# Patient Record
Sex: FEMALE | Race: WHITE
[De-identification: names, ages, dates, MRNs, and addresses within clinical notes are randomized per-mention and may not be internally consistent; named-entity substitution may affect disease eponyms.]

---

## 2017-04-05 ENCOUNTER — HOSPITAL ENCOUNTER (OUTPATIENT)
Dept: HOSPITAL 80 - FIMAGING | Age: 47
End: 2017-04-05
Attending: GENERAL ACUTE CARE HOSPITAL
Payer: COMMERCIAL

## 2017-04-05 DIAGNOSIS — Z80.3: ICD-10-CM

## 2017-04-05 DIAGNOSIS — Z12.31: Primary | ICD-10-CM

## 2017-04-05 PROCEDURE — G0202 SCR MAMMO BI INCL CAD: HCPCS

## 2017-10-13 ENCOUNTER — HOSPITAL ENCOUNTER (OUTPATIENT)
Dept: HOSPITAL 80 - FIMAGING | Age: 47
End: 2017-10-13
Attending: INTERNAL MEDICINE
Payer: COMMERCIAL

## 2017-10-13 DIAGNOSIS — Z80.3: ICD-10-CM

## 2017-10-13 DIAGNOSIS — R92.8: Primary | ICD-10-CM

## 2017-10-13 PROCEDURE — C8908 MRI W/O FOL W/CONT, BREAST,: HCPCS

## 2017-10-13 PROCEDURE — A9585 GADOBUTROL INJECTION: HCPCS

## 2017-10-31 ENCOUNTER — HOSPITAL ENCOUNTER (OUTPATIENT)
Dept: HOSPITAL 80 - FIMAGING | Age: 47
End: 2017-10-31
Attending: INTERNAL MEDICINE
Payer: COMMERCIAL

## 2017-10-31 DIAGNOSIS — Z12.39: Primary | ICD-10-CM

## 2017-10-31 DIAGNOSIS — Z80.3: ICD-10-CM

## 2017-10-31 DIAGNOSIS — R92.8: ICD-10-CM

## 2017-10-31 PROCEDURE — G0206 DX MAMMO INCL CAD UNI: HCPCS

## 2018-04-16 ENCOUNTER — HOSPITAL ENCOUNTER (OUTPATIENT)
Dept: HOSPITAL 80 - FIMAGING | Age: 48
End: 2018-04-16
Attending: INTERNAL MEDICINE
Payer: COMMERCIAL

## 2018-04-16 DIAGNOSIS — N63.20: Primary | ICD-10-CM

## 2018-04-16 PROCEDURE — C8908 MRI W/O FOL W/CONT, BREAST,: HCPCS

## 2018-04-16 PROCEDURE — A9585 GADOBUTROL INJECTION: HCPCS

## 2018-05-27 NOTE — GHP
[f rep st]



                                                            HISTORY AND PHYSICAL





DATE OF ADMISSION:  2018



CHIEF COMPLAINT:  High-risk breast cancer.



HISTORY OF PRESENT ILLNESS:  Lia is a 47-year-old woman who presented to discuss prophylactic bilat
eral mastectomy.  She has an increased risk of breast cancer based on her family history.  She had he
r 1st screening mammogram and ultrasound 2016, which was negative.  She had a breast MRI Apri
l 16, 2018, which showed stable appearance of both breasts.  There is an area of nonenhancing nodular
ity in the lateral aspect of the left breast, which may represent focal scar, BI-RADS 2.  She is frus
trated by the increased screening process and would like risk reduction surgery.  Her [facility] breast cancer risk is 35%.  She had menarche at age 14.  She still menstruates regularly.  She
 is .  She has never had a previous breast biopsy.  She does have [place] descent.  No 
family of ovarian cancer.  Her mother was diagnosed with breast cancer at age 69, a maternal aunt wit
h breast cancer at age 60, maternal cousin with breast cancer at age 38.  She has never used hormone 
replacement therapy.



PAST MEDICAL HISTORY:  None.



PAST SURGICAL HISTORY:  Breast reduction, bilateral.



MEDICATIONS:  None.



ALLERGIES:  Morphine.



FAMILY HISTORY:  Breast cancer as above, lung cancer in her uncle.



SOCIAL HISTORY:  She uses occasional alcohol.  She does use marijuana.  She does not use tobacco prod
ucts.



REVIEW OF SYSTEMS:  A 10-point review of systems negative, except per HPI.



PHYSICAL EXAMINATION:  GENERAL:  Well-developed, well-nourished woman, in no acute distress.  HEENT: 
 Normocephalic.  No gross hearing deficits.  Pupils equal and round.  No scleral icterus.  Mucous mem
branes moist. RESPIRATORY:  Clear to auscultation bilaterally.  No increased work of breathing.  CARD
IAC:  Regular rate.  MUSCULOSKELETAL:  Normal gait.  Normal nails.  PSYCH:  Mood and affect normal.  
NEURO:  Grossly intact.



IMPRESSION AND PLAN:  A 47-year-old with a 35% AKRINE breast cancer risk.  She desires prophylactic darin
ateral mastectomy without reconstruction.  We did spend a lot of time discussing sentinel lymph nodes
.  She has declined sentinel lymph node biopsy.  She understands that if occult breast cancer is foun
d that we would recommend an axillary dissection.  She stated that even if breast cancer is found she
 is not sure that she would pursue any treatment for it.  We discussed the risks and benefits of surg
freda.  We discussed that this is a risk reduction surgery, although it does not make her risk to 0%.  
She understands that there will be issues with scarring and will be a changing cosmesis.  She underst
ands there could be infection and wound healing issues.  There is also risks associated such as strok
e, heart attack, death, and blood clots.





Job #:  [id number]

## 2018-06-01 ENCOUNTER — HOSPITAL ENCOUNTER (OUTPATIENT)
Dept: HOSPITAL 80 - F3E | Age: 48
Setting detail: OBSERVATION
LOS: 1 days | Discharge: HOME | End: 2018-06-02
Attending: SURGERY | Admitting: SURGERY
Payer: COMMERCIAL

## 2018-06-01 DIAGNOSIS — N60.91: Primary | ICD-10-CM

## 2018-06-01 DIAGNOSIS — Z80.3: ICD-10-CM

## 2018-06-01 DIAGNOSIS — Z15.01: ICD-10-CM

## 2018-06-01 DIAGNOSIS — N60.92: ICD-10-CM

## 2018-06-01 PROCEDURE — 19303 MAST SIMPLE COMPLETE: CPT

## 2018-06-01 PROCEDURE — G0378 HOSPITAL OBSERVATION PER HR: HCPCS

## 2018-06-01 PROCEDURE — 0HTV0ZZ RESECTION OF BILATERAL BREAST, OPEN APPROACH: ICD-10-PCS | Performed by: SURGERY

## 2018-06-01 RX ADMIN — KETOROLAC TROMETHAMINE SCH MG: 15 INJECTION, SOLUTION INTRAMUSCULAR; INTRAVENOUS at 23:48

## 2018-06-01 RX ADMIN — KETOROLAC TROMETHAMINE SCH: 15 INJECTION, SOLUTION INTRAMUSCULAR; INTRAVENOUS at 15:46

## 2018-06-01 RX ADMIN — KETOROLAC TROMETHAMINE SCH MG: 15 INJECTION, SOLUTION INTRAMUSCULAR; INTRAVENOUS at 17:29

## 2018-06-01 RX ADMIN — Medication PRN MCG: at 13:22

## 2018-06-01 RX ADMIN — HYDROCODONE BITARTRATE AND ACETAMINOPHEN PRN TAB: 5; 325 TABLET ORAL at 21:27

## 2018-06-01 RX ADMIN — Medication PRN MCG: at 12:41

## 2018-06-01 NOTE — PDANEPAE
ANE History of Present Illness





47 year old with breasst ca





ANE Past Medical History





- Cardiovascular History


Hx Hypertension: No


Hx Arrhythmias: No


Hx Chest Pain: No


Hx Coronary Artery / Peripheral Vascular Disease: No


Hx CHF / Valvular Disease: No


Hx Palpitations: No


Cardiovascular History Comment: BP runs low





- Pulmonary History


Hx COPD: No


Hx Asthma/Reactive Airway Disease: No


Hx Recent Upper Respiratory Infection: No


Hx Oxygen in Use at Home: No


Hx Sleep Apnea: No


Sleep Apnea Screening Result - Last Documented: Positive


Pulmonary History Comment: XOCHILT uses CPAP





- Neurologic History


Hx Cerebrovascular Accident: No


Hx Seizures: No


Hx Dementia: No





- Endocrine History


Hx Diabetes: No





- Renal History


Hx Renal Disorders: No





- Liver History


Hx Hepatic Disorders: No





- Neurological & Psychiatric Hx


Hx Neurological and Psychiatric Disorders: No





- Cancer History


Hx Cancer: Yes





- Congenital Disorder History


Hx Congenital Disorders: No





- GI History


Hx Gastrointestinal Disorders: No





- Other Health History


Other Health History: nobe





- Chronic Pain History


Chronic Pain: No





- Surgical History


Prior Surgeries: knee scope,adanoids





ANE Review of Systems


Review of systems is: negative


Review of Systems: 








- Exercise capacity


METS (RN): 4 METS





ANE Patient History





- Allergies


Allergies/Adverse Reactions: 








morphine Allergy (Verified 05/07/18 15:36)


 Vomiting








- Home Medications


Home medications: home medication list seen and reviewed


Home Medications: 








NK [No Known Home Meds]  05/04/18 [Last Taken Unknown]








- NPO status


NPO Status: no food or drink >8 hours


NPO Since - Liquids (Date): 06/01/18


NPO Since - Liquids (Time): 02:00


NPO Since - Solids (Date): 05/31/18


NPO Since - Solids (Time): 19:00





- Anes Hx


Anes Hx: no prior problems





- Smoking Hx


Smoking Status: Never smoked





- Alcohol Use


Alcohol Use: None





- Family Anes Hx


Family Anes Hx: none


Family Hx Anesthesia Complications: none





ANE Labs/Vital Signs





- Vital Signs


Blood Pressure: 110/68


Heart Rate: 68


Respiratory Rate: 16


O2 Sat (%): 96


Height: 168.91 cm


Weight: 97.522 kg





ANE Physical Exam





- Airway


Neck exam: FROM


Mallampati Score: Class 1





- Pulmonary


Pulmonary: no respiratory distress





- Cardiovascular


Cardiovascular: regular rate and rhythym





- ASA Status


ASA Status: II





ANE Anesthesia Plan


Anesthesia Plan: GA w LMA

## 2018-06-01 NOTE — GOP
[f 
rep st]



                                                                OPERATIVE REPORT





DATE OF OPERATION:  06/01/2018



SURGEON:  Lindsey Sahni MD



ANESTHESIA:  Jigar Reyes MD/General.



PREOPERATIVE DIAGNOSIS:  High-risk breast cancer.



POSTOPERATIVE DIAGNOSIS:  High-risk breast cancer.



PROCEDURE PERFORMED:  Bilateral mastectomy.



FINDINGS:  No unusual



SPECIMENS:  Right breast short superior long lateral, left breast short 
superior long lateral.



ESTIMATED BLOOD LOSS:  50 cc.



INDICATIONS:  The patient is a 47-year-old woman who has an KARINE risk of 35% of 
developing breast cancer.  She did not want to continue doing high-risk 
screening.  She desired prophylactic mastectomy.



DESCRIPTION OF PROCEDURE:  The patient was brought into the operating room, 
placed supine on the table and general anesthesia was administered.  Her 
bilateral breast, chest and axilla were prepped and draped in the usual sterile 
fashion.  I made an ellipse around the right breast.  I created a superior 
inferior skin flap.  My dissection occurred to the clavicle, inframammary fold, 
mid axillary line and sternum.  I removed the breast and left the pectoralis 
fascia behind.  Hemostasis was achieved in the wound.  I placed a 15 round 
silicone drain and sutured it in place with 3-0 nylon.  I closed the wound with 
3-0 Vicryl.  I created the V and Y flap laterally.  I closed the skin with 4-0 
Monocryl, Mastisol, Steri-Strips, and a sterile dressing were applied.  In a 
similar fashion, I performed a mastectomy on the left side again with the V and 
Y flap on the lateral edge.  She tolerated the procedure well.  She was 
awakened in the operating room, extubated, transferred to PACU in stable 
condition.



DRAINS:  2 Shad-Pepe drains.





Job #:  [id number]

MTDD

## 2018-06-01 NOTE — POSTOPPROG
Post Op Note


Date of Operation: 06/01/18


Surgeon: Lindsey Sahni


Anesthesiologist: warm


Anesthesia: GET(General Endotracheal)


Pre-op Diagnosis: high risk breast ca


Post-op Diagnosis: same


Indication: 48 yo with 35% KARINE risk developing breast ca


Procedure: bilateral mastectomy


Findings: no unusual


Inf/Abcess present in the surg proc area at time of surgery?: No


EBL: Minimal


Specimen(s): 





bilateral breast short superior long lateral

## 2018-06-02 VITALS — SYSTOLIC BLOOD PRESSURE: 105 MMHG | DIASTOLIC BLOOD PRESSURE: 67 MMHG

## 2018-06-02 RX ADMIN — KETOROLAC TROMETHAMINE SCH MG: 15 INJECTION, SOLUTION INTRAMUSCULAR; INTRAVENOUS at 05:40

## 2018-06-02 RX ADMIN — HYDROCODONE BITARTRATE AND ACETAMINOPHEN PRN TAB: 5; 325 TABLET ORAL at 05:41

## 2018-06-02 RX ADMIN — HYDROCODONE BITARTRATE AND ACETAMINOPHEN PRN TAB: 5; 325 TABLET ORAL at 09:47

## 2018-06-21 ENCOUNTER — HOSPITAL ENCOUNTER (OUTPATIENT)
Dept: HOSPITAL 80 - FIMAGING | Age: 48
End: 2018-06-21
Attending: SURGERY
Payer: COMMERCIAL

## 2018-06-21 DIAGNOSIS — T81.30XA: ICD-10-CM

## 2018-06-21 DIAGNOSIS — R06.89: Primary | ICD-10-CM

## 2018-06-21 DIAGNOSIS — Z90.13: ICD-10-CM

## 2018-09-09 ENCOUNTER — HOSPITAL ENCOUNTER (EMERGENCY)
Dept: HOSPITAL 80 - FED | Age: 48
Discharge: HOME | End: 2018-09-09
Payer: COMMERCIAL

## 2018-09-09 VITALS — SYSTOLIC BLOOD PRESSURE: 120 MMHG | DIASTOLIC BLOOD PRESSURE: 78 MMHG

## 2018-09-09 DIAGNOSIS — M54.5: Primary | ICD-10-CM

## 2018-09-09 LAB — PLATELET # BLD: 262 10^3/UL (ref 150–400)

## 2018-09-09 NOTE — EDPHY
H & P


Smoking Status: Never smoked


Time Seen by Provider: 09/09/18 08:54


HPI/ROS: 





CHIEF COMPLAINT:  Left-sided chest and flank pain





HISTORY OF PRESENT ILLNESS:  48-year-old female presents to the emergency 

department with left-sided chest and flank pain that began approximately 2 

weeks ago.  She states that it initially started as some left low back pain 

which is now become more left mid back pain and left side posterior chest pain.

  Her pain is worse with movement.  She does not feel short of breath although 

she does complain of pain with deep breathing.  She denies paresthesias in her 

upper or lower extremities.  Denies fevers or chills.  Denies abdominal pain.  

Denies any urinary symptoms.  No reported trauma.  She had prophylactic 

bilateral mastectomy in June of 2018 and she is approximately 13 weeks postop.  

She has no calf pain or swelling.  No recent travel.





REVIEW OF SYSTEMS:


Constitutional:  No fever, no chills.


Eyes:  No double or blurry vision.


ENT:  No sore throat.


Respiratory:  No cough, no shortness of breath.


Cardiac:  No chest pain.


Gastrointestinal:  No abdominal pain, vomiting or diarrhea.


Genitourinary:  No dysuria.


Musculoskeletal:  Flank pain as above. No neck pain.


Skin:  No rashes.


Neurological:  No headache. (Eleanor Booth)


Past Medical/Surgical History: 





Bilateral mastectomy June 2018 (Eleanor Booth)


Social History: 





Single and lives in [place] (Eleanor Booth)


Physical Exam: 





General Appearance:  Alert, no distress.  Afebrile.  Heart rate 78, 97% on room 

air, 131/79


Eyes:  Pupils equal and round.  Extraocular motions are all intact.


ENT:  Mouth:  Mucous membranes moist.


Respiratory:  No wheezing, rhonchi, or rales, lungs are clear to auscultation.


Cardiovascular:  Regular rate and rhythm.


Gastrointestinal:  Abdomen is soft and nontender, no masses, no rebound or 

guarding, bowel sounds normal.  No CVA tenderness bilaterally.


Neurological:  Alert and oriented x 3, cranial nerves II through XII grossly 

intact


Skin:  Warm and dry, no rashes.


Musculoskeletal:  Nontender to palpate along the cervical, thoracic or lumbar 

spine.  Neck is supple.  Pain is reproducible with change of position.


Extremities:  Full range of motion and no peripheral edema.


Psychiatric:  Patient is oriented X 3, there is no agitation. (Eleanor Booth)


Constitutional: 





 Initial Vital Signs











Temperature (C)  36.8 C   09/09/18 08:17


 


Heart Rate  78   09/09/18 08:17


 


Respiratory Rate  18   09/09/18 08:17


 


Blood Pressure  131/79 H  09/09/18 08:17


 


O2 Sat (%)  97   09/09/18 08:17








 











O2 Delivery Mode               Room Air














Allergies/Adverse Reactions: 


 





morphine Allergy (Mild, Verified 09/09/18 08:16)


 Vomiting








Home Medications: 














 Medication  Instructions  Recorded


 


Cyclobenzaprine [Flexeril] 10 mg PO TIDPRN PRN #12 tab 09/09/18


 


Hydrocodone/APAP 5/325 [Norco 1 each PO Q4-6PRN PRN #11 tab 09/09/18





5/325 (*)]  


 


methylPREDNISolone [Medrol Dose 1 each PO AD #0 ea 09/09/18





Jc]  














Medical Decision Making





- Diagnostics


Imaging: Discussed imaging studies w/ On call Radiologist


ED Course/Re-evaluation: 





48-year-old female presents emergency department with left flank pain.  The 

patient had normal laboratory studies.  She was concerned about possible 

pneumonia or kidney stone or something intra-abdominal.





I discussed the case with Dr. Francisco Connell, secondary supervising physician, 

who did not directly evaluate the patient but agrees with treatment and plan.





CT imaging of the chest and abdomen and pelvis was all within normal limits.





Patient was given IV Solu-Medrol, IV Valium, IV Toradol, and was feeling much 

better.  She will be discharged with Medrol Dosepak, Flexeril, and hydrocodone 

per her request.  She was given strict instructions to return if she felt short 

of breath, increasing pain, or any other concerns. (Eleanor Booth)





I did not see this patient while she was in the emergency department.  However 

her care was discussed with the PA while the patient was in the department.  I 

agree with treatment plan and management (Francisco Connell)


Differential Diagnosis: 





Including but not limited to intra-abdominal injury, pneumothorax, rib fracture

, hematoma (Eleanor Booth)





- Data Points


Laboratory Results: 





 Laboratory Results





 09/09/18 08:40 





 09/09/18 08:40 








Medications Given: 





 








Discontinued Medications





Diazepam (Valium)  5 mg IVP EDNOW ONE


   Stop: 09/09/18 09:11


   Last Admin: 09/09/18 09:14 Dose:  5 mg


Ketorolac Tromethamine (Toradol)  30 mg IVP EDNOW ONE


   Stop: 09/09/18 11:12


   Last Admin: 09/09/18 11:14 Dose:  30 mg


Methylprednisolone Sodium Succinate (Solu-Medrol)  125 mg IVP EDNOW ONE


   Stop: 09/09/18 12:13


   Last Admin: 09/09/18 12:16 Dose:  125 mg








Departure





- Departure


Disposition: Home, Routine, Self-Care


Clinical Impression: 


 Back pain





Condition: Good


Instructions:  Back Pain (ED)


Additional Instructions: 


Medrol Dosepak as directed for 1 week.  Flexeril as needed for muscular spasm.  

Hydrocodone for severe pain to help you sleep.  Caution drowsiness with this 

medication.





Avoid any lifting or any activity that causes increasing pain.





Return to the emergency department if you feel short of breath, increasing pain

, or if you feel worse in any way.


Referrals: 


Katey Kaufman NP [Primary Care Provider] - 2-3 days without fail


Prescriptions: 


Cyclobenzaprine [Flexeril] 10 mg PO TIDPRN PRN #12 tab


 PRN Reason: prn spasms


Hydrocodone/APAP 5/325 [Norco 5/325 (*)] 1 each PO Q4-6PRN PRN #11 tab


 PRN Reason: Pain, Severe


methylPREDNISolone [Medrol Dose Jc] 1 each PO AD #0 ea

## 2023-10-12 NOTE — POSTANESTH
Post Anesthetic Evaluation


Cardiovascular Status: Normal, Stable


Respiratory Status: Normal, Stable


Level of Consciousness/Mental Status: Can Participate in Eval, Mildly Sleepy, 

Arousable


Pain Control: Adequate, Prn Tx Ordered


Nausea/Vomiting Control: Adequate, Prn Tx Ordered


Complications Possibly Related to Anesthesia: None Noted
none